# Patient Record
Sex: FEMALE | URBAN - METROPOLITAN AREA
[De-identification: names, ages, dates, MRNs, and addresses within clinical notes are randomized per-mention and may not be internally consistent; named-entity substitution may affect disease eponyms.]

---

## 2024-02-09 ENCOUNTER — APPOINTMENT (RX ONLY)
Dept: URBAN - METROPOLITAN AREA CLINIC 154 | Facility: CLINIC | Age: 44
Setting detail: DERMATOLOGY
End: 2024-02-09

## 2024-02-09 DIAGNOSIS — Z41.9 ENCOUNTER FOR PROCEDURE FOR PURPOSES OTHER THAN REMEDYING HEALTH STATE, UNSPECIFIED: ICD-10-CM

## 2024-02-09 PROCEDURE — ? JEUVEAU

## 2024-02-09 NOTE — PROCEDURE: JEUVEAU
Inferior Lateral Orbicularis Oculi Units: 15
Show Topical Anesthesia: Yes
Show Lcl Units: No
Additional Area 1 Units: 0
Glabellar Complex Units: 20
Lot #: [id number]
Forehead Units: 10
Dilution (U/0.1 Cc): 4
Expiration Date (Month Year): 6/25
Detail Level: Detailed
Post-Care Instructions: Patient instructed to not lie down for 4 hours and limit physical activity for 24 hours.
Consent: Written consent obtained. Risks include but not limited to lid/brow ptosis, bruising, swelling, diplopia, temporary effect, incomplete chemical denervation.

## 2024-03-15 ENCOUNTER — APPOINTMENT (RX ONLY)
Dept: URBAN - METROPOLITAN AREA CLINIC 154 | Facility: CLINIC | Age: 44
Setting detail: DERMATOLOGY
End: 2024-03-15

## 2024-03-22 ENCOUNTER — APPOINTMENT (RX ONLY)
Dept: URBAN - METROPOLITAN AREA CLINIC 154 | Facility: CLINIC | Age: 44
Setting detail: DERMATOLOGY
End: 2024-03-22

## 2024-03-22 DIAGNOSIS — Z41.9 ENCOUNTER FOR PROCEDURE FOR PURPOSES OTHER THAN REMEDYING HEALTH STATE, UNSPECIFIED: ICD-10-CM

## 2024-03-22 PROCEDURE — ? SIGNATURE HYDRAFACIAL

## 2024-03-22 ASSESSMENT — LOCATION DETAILED DESCRIPTION DERM: LOCATION DETAILED: INFERIOR MID FOREHEAD

## 2024-03-22 ASSESSMENT — LOCATION ZONE DERM: LOCATION ZONE: FACE

## 2024-03-22 ASSESSMENT — LOCATION SIMPLE DESCRIPTION DERM: LOCATION SIMPLE: INFERIOR FOREHEAD

## 2024-03-22 NOTE — PROCEDURE: SIGNATURE HYDRAFACIAL
Solution: Activ-4
Vacuum Pressure High Setting (Will Not Render If Set To 0): 0
Procedure: Extraction
Solution: GlySal 30%
Location Override: face
Procedure: Boost
Consent: Written consent obtained, risks reviewed including but not limited to crusting, scabbing, blistering, scarring, darker or lighter pigmentary change, bruising, and/or incomplete response.
Tip Override
Solution: Beta-HD
Procedure: Fusion
Post-Care Instructions: I reviewed with the patient in detail post-care instructions. Patient should stay away from the sun and wear sun protection until treated areas are fully healed.
Solution: Antiox-6
Tip: Hydropeel Tip, Blue
Procedure: Extend and Protect
Tip: Hydropeel Tip, Teal
Tip: Hydropeel Tip, Clear
Indication: anti-aging
Solution Override
Procedure: Exfoliation
Procedure: Peel